# Patient Record
Sex: FEMALE | Race: WHITE | NOT HISPANIC OR LATINO | Employment: FULL TIME | URBAN - METROPOLITAN AREA
[De-identification: names, ages, dates, MRNs, and addresses within clinical notes are randomized per-mention and may not be internally consistent; named-entity substitution may affect disease eponyms.]

---

## 2022-05-10 PROBLEM — E04.1 THYROID NODULE: Status: ACTIVE | Noted: 2022-05-10

## 2022-08-22 ENCOUNTER — PATIENT MESSAGE (OUTPATIENT)
Dept: NEUROLOGY | Facility: CLINIC | Age: 37
End: 2022-08-22

## 2022-08-22 ENCOUNTER — OFFICE VISIT (OUTPATIENT)
Dept: NEUROLOGY | Facility: CLINIC | Age: 37
End: 2022-08-22
Payer: OTHER GOVERNMENT

## 2022-08-22 DIAGNOSIS — R11.0 NAUSEA: ICD-10-CM

## 2022-08-22 DIAGNOSIS — R51.9 NONINTRACTABLE EPISODIC HEADACHE, UNSPECIFIED HEADACHE TYPE: ICD-10-CM

## 2022-08-22 DIAGNOSIS — R25.1 TREMOR: Primary | ICD-10-CM

## 2022-08-22 DIAGNOSIS — M19.90 ARTHRITIS: ICD-10-CM

## 2022-08-22 DIAGNOSIS — F41.9 ANXIETY: ICD-10-CM

## 2022-08-22 PROCEDURE — 99205 OFFICE O/P NEW HI 60 MIN: CPT | Mod: 95,,, | Performed by: NURSE PRACTITIONER

## 2022-08-22 PROCEDURE — 99205 PR OFFICE/OUTPT VISIT, NEW, LEVL V, 60-74 MIN: ICD-10-PCS | Mod: 95,,, | Performed by: NURSE PRACTITIONER

## 2022-08-22 RX ORDER — BUTALBITAL, ACETAMINOPHEN AND CAFFEINE 50; 325; 40 MG/1; MG/1; MG/1
1 TABLET ORAL EVERY 4 HOURS PRN
COMMUNITY
End: 2023-05-24

## 2022-08-22 RX ORDER — PRIMIDONE 50 MG/1
TABLET ORAL
Qty: 60 TABLET | Refills: 5 | Status: SHIPPED | OUTPATIENT
Start: 2022-08-22 | End: 2022-08-31

## 2022-08-22 RX ORDER — DULOXETIN HYDROCHLORIDE 20 MG/1
20 CAPSULE, DELAYED RELEASE ORAL 2 TIMES DAILY
COMMUNITY

## 2022-08-22 NOTE — PROGRESS NOTES
"Name: Lynette Dasilva  MRN: [id number]   CSN: [id number]      Date: 8-22-22    VIRTUAL VISIT  She is currently in [facility])         Referring physician:  No referring provider defined for this encounter.    Subjective:      Chief Complaint: tremor     History of Present Illness (HPI):    Lynette Dasilva is a 37 y.o. right handed female who presents today for an initial evaluation of tremor and is alone.     Had tremor for greater than 14 years, probably 20 years.   When goes to take a drink, she spills it.  Started in  and has progressed over years. Sometimes worse in RH but often equally in BH.   Other than hands, she will feel like head shakes at times (yes-yes quality) but not as bad as hands.   She can have tremor with activity and rest.   After using weed eater or mowing, tremor is much more obvious.   Sometimes will not tremor in legs while driving.    Some problems with eating at times because of tremor.   Daughter does her make-up because of tremor.   Handwriting has always been messy.     Not identified any triggers.   Noted that activity makes it worse (doing dishes, doing yard work or nervous).     She had been taking phentermine a little over a year. She has not taken regularly for the past 3 months.  She had gastric sleeve in 2019. Lost over 100 lbs. Not adhering to a specific diet. Not vegetarian.     PGM and PGF both had Parkinson's disease.   Mother, father and 16 yo daughter all with tremor.     Occasional alcohol - has not noted if makes a difference.     Dr. Puentes, retired  doc started her on propranolol and primidone at the same time for tremor. Had to stop as made her nauseated and lightheaded.     Always told BP and HR "good." Not had high blood pressure since had weight loss surgery.     Started duloxetine 20 mg BID on 7-18-22. It has caused HA and nausea. They gave her Esgic and phenergan to combat while she adjusted to duloxetine. Today was her last dose of phenergan. Anticipates " she will be coming off Esgic soon as well.   Duloxetine has been very helpful for anxiety.     Takes hydrocodone 10 mg every 8 hours for severe arthritis. Has been on this a year.      TRIED:  Propranolol LA 80 mg- took for about 3 mos- made nauseated and lightheaded   Primidone 50 mg once daily TRIED THIS AT THE SAME TIME AS PROPRANOLOL         Review of Systems   Constitutional: Negative for appetite change.   HENT: Negative for dental problem and trouble swallowing.    Respiratory: Negative for cough and choking.    Cardiovascular: Positive for leg swelling. Negative for chest pain.   Gastrointestinal: Positive for constipation. Negative for diarrhea.   Genitourinary: Negative.    Musculoskeletal: Positive for arthralgias, back pain (lower back ) and gait problem (due to L knee).   Neurological: Positive for tremors and headaches (since startng Cymbalta a month ago). Negative for dizziness, seizures and light-headedness.   Psychiatric/Behavioral: Positive for dysphoric mood (not as bad) and sleep disturbance. The patient is nervous/anxious.        Past Medical History: The patient  has a past medical history of General anesthetics causing adverse effect in therapeutic use, PONV (postoperative nausea and vomiting), and Thyroid disease.    Social History: The patient  reports that she has been smoking vaping with nicotine. She has never used smokeless tobacco. She reports current alcohol use.    Family History: Their family history includes Parkinsonism in her paternal grandfather and paternal grandmother; Tremor in her daughter, father, and mother.    Allergies: Opioids - morphine analogues; Latex, natural rubber; and Nsaids (non-steroidal anti-inflammatory drug)     Meds:   Current Outpatient Medications on File Prior to Visit   Medication Sig Dispense Refill    acetaminophen (TYLENOL) 325 MG tablet Take by mouth.      butalbital-acetaminophen-caffeine -40 mg (FIORICET, ESGIC) -40 mg per tablet Take  1 tablet by mouth every 4 (four) hours as needed.      cetirizine (ZYRTEC) 10 MG tablet       DULoxetine (CYMBALTA) 20 MG capsule Take 20 mg by mouth 2 (two) times a day.      HYDROCODONE-ACETAMINOPHEN ORAL HYDROCODONE-ACETAMINOPHEN (hydrocodone bitartrate/acetaminophen), 10MG-325MG, TABLET, ORAL, ROSA  Start Date: 10/24/21  Status: Ordered      omeprazole (PRILOSEC) 40 MG capsule       promethazine-dextromethorphan (PROMETHAZINE-DM) 6.25-15 mg/5 mL Syrp       tiZANidine (ZANAFLEX) 4 MG tablet Take 4 mg by mouth 3 (three) times daily as needed.      traZODone (DESYREL) 100 MG tablet Take 100 mg by mouth nightly.      VITAMIN D3 25 mcg (1,000 unit) tablet Take by mouth.      busPIRone (BUSPAR) 5 MG Tab       COVID-19 vac, elaine,Pfizer,,PF, (PFIZER COVID-19) 30 mcg/0.3 mL injection       diphenoxylate-atropine 2.5-0.025 mg (LOMOTIL) 2.5-0.025 mg per tablet Take 2 tablets by mouth 4 (four) times daily as needed.      phentermine 37.5 MG capsule       [DISCONTINUED] primidone (MYSOLINE) 50 MG Tab        No current facility-administered medications on file prior to visit.       FROM HER PT PORTAL FROM ANOTHER FACILITY LABS FROM 8-11-22  TSH 4.149 (0.360-3.74)  CBC- normal  Vit D-25 37.8 ()  UA slightly cloudy with blood  Objective:     Physical Exam:      Constitutional  Well-developed, well-nourished, appears stated age   Awake, alert, pleasant and cooperative.   RR equal and unlabored.   Face symmetric. No masked facies.   No hypophonia or dysarthria. No voice tremor.   Hearing intact to conversation.   High frequency, low amplitude rest tremor BH, slight.   Bilateral postural and intention tremor BH, again high freq/low amplitude.   Not entirely certain it is always rhythmic but is not distractible.   Do not appreciate head tilt/tort or head tremor.  Do not appreciate shoulder elevation but somewhat diff to fully appreciate due to video.   Seated.   .    Laboratory Results:  No visits with results  "within 3 Month(s) from this visit.   Latest known visit with results is:   Admission on 05/10/2022, Discharged on 05/10/2022   Component Date Value Ref Range Status    POC Preg Test, Ur 05/10/2022 Negative  Negative Final     Acceptable 05/10/2022 Yes   Final    Final Pathologic Diagnosis 05/10/2022    Final                    Value:1- Right thyroid lobe, hemithyroidectomy:  Nodular hyperplasia with adenomatoid nodules.  No tumor identified.      Gross 05/10/2022    Final                    Value:5/10/22 ELIAS/IJ/HT  The specimen is received in a formalin container with the patient's name  "Lynette Dasilva" which corresponds with the [facility] label, and is designated as  "right thyroid lobe", consists of an unoriented 3.8 g, 4.2 x 2.4 x 1.9 cm  right hemithyroidectomy.  The intact capsule is gray-brown, glistening and  lobulated with multiple adhesions.  The specimen is inked as follows:  Posterior = black anterior = green, isthmus margin = orange.  The specimen is  serially sectioned along the superior to inferior axis to reveal a 1.3 x 1.0  x 0.8 cm pale-brown, partially circumscribed nodule abutting the inked  posterior capsule, located on the inferior pole, 0.3 cm from the isthmus  margin and 0.4 cm from the anterior capsule.  The rest of the parenchyma is  red-brown, spongy and unremarkable for additional lesions.  Representative  sections are submitted as follows:  Cassette summary:  1A-1C -  entire nodule  1D-1E - 4 representative sections of uninvolved parenchyma.      Disclaimer 05/10/2022    Final                    Value:Unless 'gross only' is stated, the final diagnosis for each specimen is based  on a microscopic examination of appropriate tissue sections.             Imaging:   No results found for this or any previous visit.        Assessment and Plan     Tremor  -     primidone (MYSOLINE) 50 MG Tab; Take half to one table twice as directed.  Dispense: 60 tablet; Refill: 5  -     Vitamin " B1; Future; Expected date: 08/22/2022  -     Vitamin B12; Future; Expected date: 08/22/2022  -     COPPER, SERUM; Future; Expected date: 08/22/2022  -     Ceruloplasmin; Future; Expected date: 08/22/2022  -     FOLATE; Future; Expected date: 08/22/2022    Anxiety  Comments:  better with recent addition of duloxetine    Nonintractable episodic headache, unspecified headache type  Comments:  started when started duloxetine, has been taking Esgic to get thru first month while adjsuts to duloxeitne    Nausea  Comments:  associated with HA with recent start of duloxetine, has been using phenergan PRN    Arthritis  Comments:  takes hydrocodone TID (every 8 hours)        Medical Decision Making:    Tremor looks most consistent with ET today. Dystonia is included in differentials. Will also screen for Everette's.     She will have labs looking for reversible/contributing causes of tremor.   Tremor looks most cw ET today. She does have a FMH tremor (both parents and her daughter).  Does have a bit of drug induced quality at times. She did take phenergan this AM.   She had partial thyroidectomy a few mos ago. Most recent thyroid levels with slt elevated TSH with normal T4.     Discussed medications can increase tremor, including phenergan.     She is interested in trying tremor medication. She will re-trial primidone since she started this with propranolol LA.   I will send her instructions on portal on how to start.  Will titrate to 50 mg BID.    I will see her back in 3 months. VV is fine for now.      ..Total time: 68 minutes spent on the encounter, which includes face to face time and non-face to face time preparing to see the patient (eg, review of tests), Obtaining and/or reviewing separately obtained history, Documenting clinical information in the electronic or other health record, Independently interpreting results (not separately reported) and communicating results to the patient/family/caregiver, or Care coordination  (not separately reported).       Tena Ny, EMELIA, NP-C  Division of Movement and Memory Disorders  [facility]  [phone]

## 2022-11-29 ENCOUNTER — PATIENT MESSAGE (OUTPATIENT)
Dept: NEUROLOGY | Facility: CLINIC | Age: 37
End: 2022-11-29

## 2022-11-29 ENCOUNTER — OFFICE VISIT (OUTPATIENT)
Dept: NEUROLOGY | Facility: CLINIC | Age: 37
End: 2022-11-29
Payer: OTHER GOVERNMENT

## 2022-11-29 DIAGNOSIS — R25.1 TREMOR: ICD-10-CM

## 2022-11-29 PROCEDURE — 99214 PR OFFICE/OUTPT VISIT, EST, LEVL IV, 30-39 MIN: ICD-10-PCS | Mod: 95,,, | Performed by: NURSE PRACTITIONER

## 2022-11-29 PROCEDURE — 99214 OFFICE O/P EST MOD 30 MIN: CPT | Mod: 95,,, | Performed by: NURSE PRACTITIONER

## 2022-11-29 RX ORDER — PRIMIDONE 50 MG/1
TABLET ORAL
Qty: 270 TABLET | Refills: 1 | Status: SHIPPED | OUTPATIENT
Start: 2022-11-29 | End: 2023-05-23 | Stop reason: SDUPTHER

## 2022-11-29 NOTE — PROGRESS NOTES
11-29-22    The patient location is: [place]  The chief complaint leading to consultation is: tremor    Visit type: audiovisual    Face to Face time with patient: 37 minutes of total time spent on the encounter, which includes face to face time and non-face to face time preparing to see the patient (eg, review of tests), Obtaining and/or reviewing separately obtained history, Documenting clinical information in the electronic or other health record, Independently interpreting results (not separately reported) and communicating results to the patient/family/caregiver, or Care coordination (not separately reported).         Each patient to whom he or she provides medical services by telemedicine is:  (1) informed of the relationship between the physician and patient and the respective role of any other health care provider with respect to management of the patient; and (2) notified that he or she may decline to receive medical services by telemedicine and may withdraw from such care at any time.    Notes:   37 right handed female who presents today for Follow up of tremor, most cw ET, dystonia incl in differentials.labs ordered, including screen for Everette's. She did not have obtained as of yet. She forgot as has had 2 knee surgeries since our last visit. She re-tried primidone, titrating to 50 mg BID at last visit.     Primidone 50 mg 5AM and 9PM. Reji ok.   Tremor is a lot better. Describes it as 40-50% better.   There are times it is a lot worse.   Too much caffeine is a trigger.   Being nervous is a trigger.     Cooking or holding phone she is more aware of tremor.   Tremor BH (LH>RH).   Handwriting is fine.   She is able to put on some of her make up now but dtr still applies her eye make up.     No longer taking phenergan.    Drinks on occasion. Maybe every couple of months.      She would like to increase primidone a little more.     TRIED:  Propranolol LA 80 mg- took for about 3 mos- made nauseated and lightheaded    Primidone 50 mg once daily TRIED THIS AT THE SAME TIME AS PROPRANOLOL         LIMITED EXAM: took at 5AM, exam time 1530  Awake, alert, pleasant and cooperative.   Good historian.   RR equal and unlabored. NAD.   Face symmetric.   No masked facies.   Language spontaneous, fluent. No hypophonia or dysarthria.   Hearing intact to conversation.   No rest tremor.   Outstretched, no tremor noted RH and slight LH  At core, slight tremor RH and near mild LH, does not increase with repose.  No kinetic tremor appreciated  Slight intention tremor RH and near mild LH  SYLVAIN normal.   Seated.    IMPRESSION:  Tremor, most cw ET      PLAN:  Labs as ordered last visit. Reminded fasting not required.     Increase primidone.   For the next week, continue 1 tab in AM and take 1.5 tabs qhs.  If not enough improvement, she can either take 1.5 tabs BID or she can take 1 tab in AM and 2 tabs qhs.   Will send Rx.  Follow up 6 mos, unless needed sooner.        ..  ..Tena Ny, EMELIA, NP-C

## 2023-01-26 ENCOUNTER — PATIENT MESSAGE (OUTPATIENT)
Dept: NEUROLOGY | Facility: CLINIC | Age: 38
End: 2023-01-26
Payer: OTHER GOVERNMENT

## 2023-01-26 PROBLEM — Z79.891 LONG TERM (CURRENT) USE OF OPIATE ANALGESIC: Status: ACTIVE | Noted: 2023-01-26

## 2023-01-26 PROBLEM — G25.0 FAMILIAL TREMOR: Status: ACTIVE | Noted: 2023-01-26

## 2023-01-26 PROBLEM — Z81.8 FAMILY HISTORY OF DEMENTIA: Status: ACTIVE | Noted: 2023-01-26

## 2023-01-26 PROBLEM — Z82.0 FAMILY HISTORY OF TREMOR: Status: ACTIVE | Noted: 2023-01-26

## 2023-01-26 PROBLEM — G31.84 MCI (MILD COGNITIVE IMPAIRMENT) WITH MEMORY LOSS: Status: ACTIVE | Noted: 2023-01-26

## 2023-01-26 PROBLEM — F41.9 ANXIETY: Status: ACTIVE | Noted: 2023-01-26

## 2023-01-26 PROBLEM — G44.40 MEDICATION OVERUSE HEADACHE: Status: ACTIVE | Noted: 2023-01-26

## 2023-01-26 PROBLEM — G89.4 CHRONIC PAIN SYNDROME: Status: ACTIVE | Noted: 2023-01-26

## 2023-02-09 ENCOUNTER — PATIENT MESSAGE (OUTPATIENT)
Dept: NEUROLOGY | Facility: CLINIC | Age: 38
End: 2023-02-09
Payer: OTHER GOVERNMENT

## 2023-05-23 DIAGNOSIS — R25.1 TREMOR: ICD-10-CM

## 2023-05-24 RX ORDER — PRIMIDONE 50 MG/1
TABLET ORAL
Qty: 270 TABLET | Refills: 1 | Status: SHIPPED | OUTPATIENT
Start: 2023-05-24 | End: 2023-11-08

## 2023-05-25 ENCOUNTER — PATIENT MESSAGE (OUTPATIENT)
Dept: NEUROLOGY | Facility: CLINIC | Age: 38
End: 2023-05-25
Payer: OTHER GOVERNMENT

## 2023-05-30 ENCOUNTER — PATIENT MESSAGE (OUTPATIENT)
Dept: NEUROLOGY | Facility: CLINIC | Age: 38
End: 2023-05-30
Payer: OTHER GOVERNMENT

## 2023-11-07 DIAGNOSIS — R25.1 TREMOR: ICD-10-CM

## 2023-11-08 RX ORDER — PRIMIDONE 50 MG/1
TABLET ORAL
Qty: 90 TABLET | Refills: 0 | Status: SHIPPED | OUTPATIENT
Start: 2023-11-08

## 2024-06-08 ENCOUNTER — PATIENT MESSAGE (OUTPATIENT)
Dept: ADMINISTRATIVE | Facility: OTHER | Age: 39
End: 2024-06-08
Payer: OTHER GOVERNMENT